# Patient Record
Sex: MALE | Race: OTHER | NOT HISPANIC OR LATINO | URBAN - METROPOLITAN AREA
[De-identification: names, ages, dates, MRNs, and addresses within clinical notes are randomized per-mention and may not be internally consistent; named-entity substitution may affect disease eponyms.]

---

## 2019-06-04 ENCOUNTER — INPATIENT (INPATIENT)
Facility: HOSPITAL | Age: 44
LOS: 0 days | Discharge: HOME | End: 2019-06-05
Attending: SURGERY | Admitting: SURGERY
Payer: SUBSIDIZED

## 2019-06-04 VITALS
SYSTOLIC BLOOD PRESSURE: 120 MMHG | TEMPERATURE: 97 F | OXYGEN SATURATION: 97 % | RESPIRATION RATE: 16 BRPM | DIASTOLIC BLOOD PRESSURE: 70 MMHG | HEART RATE: 70 BPM

## 2019-06-04 LAB
ALBUMIN SERPL ELPH-MCNC: 4.8 G/DL — SIGNIFICANT CHANGE UP (ref 3.5–5.2)
ALP SERPL-CCNC: 83 U/L — SIGNIFICANT CHANGE UP (ref 30–115)
ALT FLD-CCNC: 83 U/L — HIGH (ref 0–41)
ANION GAP SERPL CALC-SCNC: 12 MMOL/L — SIGNIFICANT CHANGE UP (ref 7–14)
APTT BLD: 29.5 SEC — SIGNIFICANT CHANGE UP (ref 27–39.2)
AST SERPL-CCNC: 48 U/L — HIGH (ref 0–41)
BASOPHILS # BLD AUTO: 0.03 K/UL — SIGNIFICANT CHANGE UP (ref 0–0.2)
BASOPHILS NFR BLD AUTO: 0.3 % — SIGNIFICANT CHANGE UP (ref 0–1)
BILIRUB SERPL-MCNC: 0.8 MG/DL — SIGNIFICANT CHANGE UP (ref 0.2–1.2)
BLD GP AB SCN SERPL QL: SIGNIFICANT CHANGE UP
BUN SERPL-MCNC: 19 MG/DL — SIGNIFICANT CHANGE UP (ref 10–20)
CALCIUM SERPL-MCNC: 9.8 MG/DL — SIGNIFICANT CHANGE UP (ref 8.5–10.1)
CHLORIDE SERPL-SCNC: 102 MMOL/L — SIGNIFICANT CHANGE UP (ref 98–110)
CO2 SERPL-SCNC: 26 MMOL/L — SIGNIFICANT CHANGE UP (ref 17–32)
CREAT SERPL-MCNC: 0.8 MG/DL — SIGNIFICANT CHANGE UP (ref 0.7–1.5)
EOSINOPHIL # BLD AUTO: 0.06 K/UL — SIGNIFICANT CHANGE UP (ref 0–0.7)
EOSINOPHIL NFR BLD AUTO: 0.5 % — SIGNIFICANT CHANGE UP (ref 0–8)
GLUCOSE SERPL-MCNC: 148 MG/DL — HIGH (ref 70–99)
HCT VFR BLD CALC: 42 % — SIGNIFICANT CHANGE UP (ref 42–52)
HGB BLD-MCNC: 13.8 G/DL — LOW (ref 14–18)
IMM GRANULOCYTES NFR BLD AUTO: 0.5 % — HIGH (ref 0.1–0.3)
LIDOCAIN IGE QN: 19 U/L — SIGNIFICANT CHANGE UP (ref 7–60)
LYMPHOCYTES # BLD AUTO: 1.6 K/UL — SIGNIFICANT CHANGE UP (ref 1.2–3.4)
LYMPHOCYTES # BLD AUTO: 13.5 % — LOW (ref 20.5–51.1)
MCHC RBC-ENTMCNC: 29.9 PG — SIGNIFICANT CHANGE UP (ref 27–31)
MCHC RBC-ENTMCNC: 32.9 G/DL — SIGNIFICANT CHANGE UP (ref 32–37)
MCV RBC AUTO: 90.9 FL — SIGNIFICANT CHANGE UP (ref 80–94)
MONOCYTES # BLD AUTO: 0.7 K/UL — HIGH (ref 0.1–0.6)
MONOCYTES NFR BLD AUTO: 5.9 % — SIGNIFICANT CHANGE UP (ref 1.7–9.3)
NEUTROPHILS # BLD AUTO: 9.42 K/UL — HIGH (ref 1.4–6.5)
NEUTROPHILS NFR BLD AUTO: 79.3 % — HIGH (ref 42.2–75.2)
NRBC # BLD: 0 /100 WBCS — SIGNIFICANT CHANGE UP (ref 0–0)
PLATELET # BLD AUTO: 196 K/UL — SIGNIFICANT CHANGE UP (ref 130–400)
POTASSIUM SERPL-MCNC: 5.4 MMOL/L — HIGH (ref 3.5–5)
POTASSIUM SERPL-SCNC: 5.4 MMOL/L — HIGH (ref 3.5–5)
PROT SERPL-MCNC: 7.7 G/DL — SIGNIFICANT CHANGE UP (ref 6–8)
RBC # BLD: 4.62 M/UL — LOW (ref 4.7–6.1)
RBC # FLD: 12.8 % — SIGNIFICANT CHANGE UP (ref 11.5–14.5)
SODIUM SERPL-SCNC: 140 MMOL/L — SIGNIFICANT CHANGE UP (ref 135–146)
WBC # BLD: 11.87 K/UL — HIGH (ref 4.8–10.8)
WBC # FLD AUTO: 11.87 K/UL — HIGH (ref 4.8–10.8)

## 2019-06-04 PROCEDURE — 99285 EMERGENCY DEPT VISIT HI MDM: CPT

## 2019-06-04 PROCEDURE — 71101 X-RAY EXAM UNILAT RIBS/CHEST: CPT | Mod: 26,RT

## 2019-06-04 PROCEDURE — 71046 X-RAY EXAM CHEST 2 VIEWS: CPT | Mod: 26

## 2019-06-04 PROCEDURE — 71260 CT THORAX DX C+: CPT | Mod: 26

## 2019-06-04 PROCEDURE — 93010 ELECTROCARDIOGRAM REPORT: CPT

## 2019-06-04 PROCEDURE — 74177 CT ABD & PELVIS W/CONTRAST: CPT | Mod: 26

## 2019-06-04 RX ORDER — SODIUM CHLORIDE 9 MG/ML
1000 INJECTION INTRAMUSCULAR; INTRAVENOUS; SUBCUTANEOUS ONCE
Refills: 0 | Status: COMPLETED | OUTPATIENT
Start: 2019-06-04 | End: 2019-06-04

## 2019-06-04 RX ORDER — HEPARIN SODIUM 5000 [USP'U]/ML
5000 INJECTION INTRAVENOUS; SUBCUTANEOUS EVERY 8 HOURS
Refills: 0 | Status: DISCONTINUED | OUTPATIENT
Start: 2019-06-04 | End: 2019-06-05

## 2019-06-04 RX ORDER — CHLORHEXIDINE GLUCONATE 213 G/1000ML
1 SOLUTION TOPICAL
Refills: 0 | Status: DISCONTINUED | OUTPATIENT
Start: 2019-06-04 | End: 2019-06-05

## 2019-06-04 RX ORDER — IBUPROFEN 200 MG
400 TABLET ORAL EVERY 6 HOURS
Refills: 0 | Status: DISCONTINUED | OUTPATIENT
Start: 2019-06-04 | End: 2019-06-05

## 2019-06-04 RX ORDER — PANTOPRAZOLE SODIUM 20 MG/1
40 TABLET, DELAYED RELEASE ORAL DAILY
Refills: 0 | Status: DISCONTINUED | OUTPATIENT
Start: 2019-06-04 | End: 2019-06-05

## 2019-06-04 RX ORDER — OXYCODONE AND ACETAMINOPHEN 5; 325 MG/1; MG/1
2 TABLET ORAL EVERY 6 HOURS
Refills: 0 | Status: DISCONTINUED | OUTPATIENT
Start: 2019-06-04 | End: 2019-06-04

## 2019-06-04 RX ORDER — ACETAMINOPHEN 500 MG
650 TABLET ORAL EVERY 6 HOURS
Refills: 0 | Status: DISCONTINUED | OUTPATIENT
Start: 2019-06-04 | End: 2019-06-05

## 2019-06-04 RX ORDER — MORPHINE SULFATE 50 MG/1
6 CAPSULE, EXTENDED RELEASE ORAL ONCE
Refills: 0 | Status: DISCONTINUED | OUTPATIENT
Start: 2019-06-04 | End: 2019-06-04

## 2019-06-04 RX ORDER — OXYCODONE HYDROCHLORIDE 5 MG/1
5 TABLET ORAL EVERY 6 HOURS
Refills: 0 | Status: DISCONTINUED | OUTPATIENT
Start: 2019-06-04 | End: 2019-06-05

## 2019-06-04 RX ORDER — MORPHINE SULFATE 50 MG/1
4 CAPSULE, EXTENDED RELEASE ORAL ONCE
Refills: 0 | Status: DISCONTINUED | OUTPATIENT
Start: 2019-06-04 | End: 2019-06-04

## 2019-06-04 RX ORDER — ONDANSETRON 8 MG/1
4 TABLET, FILM COATED ORAL EVERY 6 HOURS
Refills: 0 | Status: DISCONTINUED | OUTPATIENT
Start: 2019-06-04 | End: 2019-06-05

## 2019-06-04 RX ORDER — OXYCODONE AND ACETAMINOPHEN 5; 325 MG/1; MG/1
2 TABLET ORAL ONCE
Refills: 0 | Status: DISCONTINUED | OUTPATIENT
Start: 2019-06-04 | End: 2019-06-04

## 2019-06-04 RX ADMIN — SODIUM CHLORIDE 2000 MILLILITER(S): 9 INJECTION INTRAMUSCULAR; INTRAVENOUS; SUBCUTANEOUS at 12:57

## 2019-06-04 RX ADMIN — OXYCODONE AND ACETAMINOPHEN 2 TABLET(S): 5; 325 TABLET ORAL at 11:52

## 2019-06-04 RX ADMIN — HEPARIN SODIUM 5000 UNIT(S): 5000 INJECTION INTRAVENOUS; SUBCUTANEOUS at 21:32

## 2019-06-04 RX ADMIN — MORPHINE SULFATE 6 MILLIGRAM(S): 50 CAPSULE, EXTENDED RELEASE ORAL at 16:56

## 2019-06-04 NOTE — CONSULT NOTE ADULT - SUBJECTIVE AND OBJECTIVE BOX
TRAUMA ACTIVATION LEVEL:  TRAUMA CONSULT    MECHANISM OF INJURY:      [] Blunt  	[] MVC	[X] Fall	[] Pedestrian Struck	[] Motorcycle   [] Assault   [] Bicycle collision  [] Sports injury     [] Penetrating  	[] Gun Shot Wound 		[] Stab Wound    GCS: 15	 E: 4	V: 5	M: 6    HPI:  43M, no significant PMHx, presents to ED s/p fall. Pt states he was working outside, misstepped and fell onto a beam on his R side. Pt states he felt immediate pain, was able get up. Pt states he had trouble breathing initially but is not experiencing that now. Pt presents w/ R sided rib pain, no ecchymosis, no external signs of trauma. Pt denies HT, denies LOC, no abdominal pain, no musculoskeletal pain.     PAST MEDICAL & SURGICAL HISTORY:  denies     Allergies    No Known Allergies    Intolerances        Home Medications:  denies     ROS: 10-system review is otherwise negative except HPI above.      Primary Survey:    A - airway intact  B - bilateral breath sounds and good chest rise  C - palpable pulses in all extremities  D - GCS 15 on arrival, ANTHONY  Exposure obtained    Vital Signs Last 24 Hrs  T(C): 36.1 (04 Jun 2019 11:07), Max: 36.1 (04 Jun 2019 11:07)  T(F): 97 (04 Jun 2019 11:07), Max: 97 (04 Jun 2019 11:07)  HR: 70 (04 Jun 2019 11:07) (70 - 70)  BP: 120/70 (04 Jun 2019 11:07) (120/70 - 120/70)  BP(mean): --  RR: 16 (04 Jun 2019 11:07) (16 - 16)  SpO2: 97% (04 Jun 2019 11:07) (97% - 97%)    Secondary Survey:   General: NAD  HEENT: Normocephalic, atraumatic, EOMI, PEERLA. no scalp lacerations   Neck: Soft, midline trachea. no cspine tenderness  Chest: mild R sided chest wall tenderness. or subq emphysema, no ecchymosis or abrasions.   Cardiac: S1, S2, RRR  Respiratory: Bilateral breath sounds, clear and equal bilaterally  Abdomen: Soft, non-distended, non-tender, no rebound,   Groin: Normal appearing, pelvis stable   Ext: palp radial b/l UE, b/l DP palp in Lower Extrem.   Back: no TTP, no palpable runoff/stepoff/deformity    FAST    Procedures:    LABS:  Labs:  CAPILLARY BLOOD GLUCOSE                              13.8   11.87 )-----------( 196      ( 04 Jun 2019 12:40 )             42.0       Auto Neutrophil %: 79.3 % (06-04-19 @ 12:40)  Auto Immature Granulocyte %: 0.5 % (06-04-19 @ 12:40)    06-04    140  |  102  |  19  ----------------------------<  148<H>  5.4<H>   |  26  |  0.8      Calcium, Total Serum: 9.8 mg/dL (06-04-19 @ 12:40)      LFTs:             7.7  | 0.8  | 48       ------------------[83      ( 04 Jun 2019 12:40 )  4.8  | x    | 83          Lipase:19     Amylase:x             Coags:     x      ----< x       ( 04 Jun 2019 12:40 )     29.5                RADIOLOGY & ADDITIONAL STUDIES:    PENDING     --------------------------------------------------------------------------------------- TRAUMA ACTIVATION LEVEL:  TRAUMA CONSULT    MECHANISM OF INJURY:      [] Blunt  	[] MVC	[X] Fall	[] Pedestrian Struck	[] Motorcycle   [] Assault   [] Bicycle collision  [] Sports injury     [] Penetrating  	[] Gun Shot Wound 		[] Stab Wound    GCS: 15	 E: 4	V: 5	M: 6    HPI:  43M, no significant PMHx, presents to ED s/p fall. Pt states he was working outside, misstepped and fell onto a beam on his R side. Pt states he felt immediate pain, was able get up. Pt states he had trouble breathing initially but is not experiencing that now. Pt presents w/ R sided rib pain, no ecchymosis, no external signs of trauma. Pt denies HT, denies LOC, no abdominal pain, no musculoskeletal pain.     PAST MEDICAL & SURGICAL HISTORY:  denies     Allergies    No Known Allergies    Intolerances        Home Medications:  denies     ROS: 10-system review is otherwise negative except HPI above.      Primary Survey:    A - airway intact  B - bilateral breath sounds and good chest rise  C - palpable pulses in all extremities  D - GCS 15 on arrival, ANTHONY  Exposure obtained    Vital Signs Last 24 Hrs  T(C): 36.1 (04 Jun 2019 11:07), Max: 36.1 (04 Jun 2019 11:07)  T(F): 97 (04 Jun 2019 11:07), Max: 97 (04 Jun 2019 11:07)  HR: 70 (04 Jun 2019 11:07) (70 - 70)  BP: 120/70 (04 Jun 2019 11:07) (120/70 - 120/70)  BP(mean): --  RR: 16 (04 Jun 2019 11:07) (16 - 16)  SpO2: 97% (04 Jun 2019 11:07) (97% - 97%)    Secondary Survey:   General: NAD  HEENT: Normocephalic, atraumatic, EOMI, PEERLA. no scalp lacerations   Neck: Soft, midline trachea. no cspine tenderness  Chest: mild R sided chest wall tenderness. or subq emphysema, no ecchymosis or abrasions.   Cardiac: S1, S2, RRR  Respiratory: Bilateral breath sounds, clear and equal bilaterally  Abdomen: Soft, non-distended, non-tender, no rebound,   Groin: Normal appearing, pelvis stable   Ext: palp radial b/l UE, b/l DP palp in Lower Extrem.   Back: no TTP, no palpable runoff/stepoff/deformity    FAST    Procedures:    LABS:  Labs:  CAPILLARY BLOOD GLUCOSE                              13.8   11.87 )-----------( 196      ( 04 Jun 2019 12:40 )             42.0       Auto Neutrophil %: 79.3 % (06-04-19 @ 12:40)  Auto Immature Granulocyte %: 0.5 % (06-04-19 @ 12:40)    06-04    140  |  102  |  19  ----------------------------<  148<H>  5.4<H>   |  26  |  0.8      Calcium, Total Serum: 9.8 mg/dL (06-04-19 @ 12:40)      LFTs:             7.7  | 0.8  | 48       ------------------[83      ( 04 Jun 2019 12:40 )  4.8  | x    | 83          Lipase:19     Amylase:x             Coags:     x      ----< x       ( 04 Jun 2019 12:40 )     29.5                RADIOLOGY & ADDITIONAL STUDIES:  < from: Xray Chest 2 Views PA/Lat (06.04.19 @ 12:27) >  Impression:    No radiographic evidence of acute cardiopulmonary disease.  Minimally displaced right eighth rib fracture  < end of copied text >      < from: CT Abdomen and Pelvis w/ IV Cont (06.04.19 @ 16:01) >  IMPRESSION:  Minimally displaced right posterior 8th, 9th, and 10th ribs with adjacent   small right pleural effusion. Each of these are fractured in 2 places.   Right lower lung field opacity, likely atelectasis. In the setting of   trauma, contusion is not excluded.  No pneumothorax.  No evidence for acute intra-abdominal traumatic pathology.  < end of copied text >      < from: CT Chest w/ IV Cont (06.04.19 @ 16:00) >  IMPRESSION:  Minimally displaced right posterior 8th, 9th, and 10th ribs with adjacent   small right pleural effusion. Each of these are fractured in 2 places.   Right lower lung field opacity, likely atelectasis. In the setting of   trauma, contusion is not excluded.  No pneumothorax.  No evidence for acute intra-abdominal traumatic pathology.  < end of copied text >      ---------------------------------------------------------------------------------------

## 2019-06-04 NOTE — ED PROVIDER NOTE - OBJECTIVE STATEMENT
Patient is a 43 year old male without PMHX presenting to ED S/P trip and fall at work today. States that he landed on his right side on ribs. c/o right rib pain. Denies had trauma, back pain, neck pain, n/v, abdominal pain, fevers, shortness of breath, other injuries

## 2019-06-04 NOTE — ED PROVIDER NOTE - NS ED ROS FT
Constitutional: See HPI. No fever/chills.  Neck: No neck pain or stiffness.  Cardiac: No chest pain, SOB or edema. No chest pain with exertion.  Respiratory: No cough or respiratory distress. No hemoptysis.   GI: No nausea, vomiting, diarrhea or abdominal pain.  : No dysuria, frequency or burning.   MS: + right sided rib pain  Neuro: No headache or weakness. No LOC.  Skin: No rash.  Endocrine: No history of thyroid disease or diabetes.  Except as documented in the HPI, all other systems are negative.

## 2019-06-04 NOTE — H&P ADULT - HISTORY OF PRESENT ILLNESS
HPI:  43M, no significant PMHx, presents to ED s/p fall. Pt states he was working outside, misstepped and fell onto a beam on his R side. Pt states he felt immediate pain, was able get up. Pt states he had trouble breathing initially but is not experiencing that now. Pt presents w/ R sided rib pain, no ecchymosis, no external signs of trauma. Pt denies HT, denies LOC, no abdominal pain, no musculoskeletal pain.     PAST MEDICAL & SURGICAL HISTORY:  denies     Allergies    No Known Allergies    Intolerances        Home Medications:  denies     ROS: 10-system review is otherwise negative except HPI above.      Primary Survey:    A - airway intact  B - bilateral breath sounds and good chest rise  C - palpable pulses in all extremities  D - GCS 15 on arrival, ANTHONY  Exposure obtained    Vital Signs Last 24 Hrs  T(C): 36.1 (04 Jun 2019 11:07), Max: 36.1 (04 Jun 2019 11:07)  T(F): 97 (04 Jun 2019 11:07), Max: 97 (04 Jun 2019 11:07)  HR: 70 (04 Jun 2019 11:07) (70 - 70)  BP: 120/70 (04 Jun 2019 11:07) (120/70 - 120/70)  BP(mean): --  RR: 16 (04 Jun 2019 11:07) (16 - 16)  SpO2: 97% (04 Jun 2019 11:07) (97% - 97%)    Secondary Survey:   General: NAD  HEENT: Normocephalic, atraumatic, EOMI, PEERLA. no scalp lacerations   Neck: Soft, midline trachea. no cspine tenderness  Chest: mild R sided chest wall tenderness. or subq emphysema, no ecchymosis or abrasions.   Cardiac: S1, S2, RRR  Respiratory: Bilateral breath sounds, clear and equal bilaterally  Abdomen: Soft, non-distended, non-tender, no rebound,   Groin: Normal appearing, pelvis stable   Ext: palp radial b/l UE, b/l DP palp in Lower Extrem.   Back: no TTP, no palpable runoff/stepoff/deformity    FAST    Procedures:    LABS:  Labs:  CAPILLARY BLOOD GLUCOSE                              13.8   11.87 )-----------( 196      ( 04 Jun 2019 12:40 )             42.0       Auto Neutrophil %: 79.3 % (06-04-19 @ 12:40)  Auto Immature Granulocyte %: 0.5 % (06-04-19 @ 12:40)    06-04    140  |  102  |  19  ----------------------------<  148<H>  5.4<H>   |  26  |  0.8      Calcium, Total Serum: 9.8 mg/dL (06-04-19 @ 12:40)      LFTs:             7.7  | 0.8  | 48       ------------------[83      ( 04 Jun 2019 12:40 )  4.8  | x    | 83          Lipase:19     Amylase:x             Coags:     x      ----< x       ( 04 Jun 2019 12:40 )     29.5                RADIOLOGY & ADDITIONAL STUDIES:  < from: Xray Chest 2 Views PA/Lat (06.04.19 @ 12:27) >  Impression:    No radiographic evidence of acute cardiopulmonary disease.  Minimally displaced right eighth rib fracture  < end of copied text >      < from: CT Abdomen and Pelvis w/ IV Cont (06.04.19 @ 16:01) >  IMPRESSION:  Minimally displaced right posterior 8th, 9th, and 10th ribs with adjacent   small right pleural effusion. Each of these are fractured in 2 places.   Right lower lung field opacity, likely atelectasis. In the setting of   trauma, contusion is not excluded.  No pneumothorax.  No evidence for acute intra-abdominal traumatic pathology.  < end of copied text >      < from: CT Chest w/ IV Cont (06.04.19 @ 16:00) >  IMPRESSION:  Minimally displaced right posterior 8th, 9th, and 10th ribs with adjacent   small right pleural effusion. Each of these are fractured in 2 places.   Right lower lung field opacity, likely atelectasis. In the setting of   trauma, contusion is not excluded.  No pneumothorax.  No evidence for acute intra-abdominal traumatic pathology.  < end of copied text >      ---------------------------------------------------------------------------------------        Assessment and Recommendation:   · Assessment		  ASSESSMENT:  43M, s/p fall onto R side, -HT, -LOC, -AC. Right sided chest wall pain. Pulling 1000 on IS.  Minimally displaced R post. 8,9,10 rib fx, w/ adjacent small R pleural effusion.   R lower lung opacity, likely atelectasis   PLAN:   - Tylenol and Motrin around the clock  - percocet for breakthrough  - Incentive spirometry  - PO diet  - 11:30 labs  - PPI

## 2019-06-04 NOTE — ED PROVIDER NOTE - CARE PLAN
Principal Discharge DX:	Closed fracture of multiple ribs of right side, initial encounter  Secondary Diagnosis:	Fall, initial encounter

## 2019-06-04 NOTE — ED ADULT TRIAGE NOTE - CHIEF COMPLAINT QUOTE
right sided rib pain x 45 mins, fell down while working, landed on wood on right side, denies head injury, no LOC and anticoagulant, no flail chest noted

## 2019-06-04 NOTE — ED PROVIDER NOTE - ATTENDING CONTRIBUTION TO CARE
Seen with PA agree with above, lungs- clear, abdomen- soft no tenderness to any region, neuro- non focal

## 2019-06-04 NOTE — ED ADULT NURSE NOTE - NSIMPLEMENTINTERV_GEN_ALL_ED
Implemented All Fall Risk Interventions:  [place] to call system. Call bell, personal items and telephone within reach. Instruct patient to call for assistance. Room bathroom lighting operational. Non-slip footwear when patient is off stretcher. Physically safe environment: no spills, clutter or unnecessary equipment. Stretcher in lowest position, wheels locked, appropriate side rails in place. Provide visual cue, wrist band, yellow gown, etc. Monitor gait and stability. Monitor for mental status changes and reorient to person, place, and time. Review medications for side effects contributing to fall risk. Reinforce activity limits and safety measures with patient and family.

## 2019-06-04 NOTE — ED PROVIDER NOTE - PROGRESS NOTE DETAILS
When patient seen initially, he was in too much pain to lay down for abdominal exam,. After pain meds, was better able to evaluate abdominal exam and patient had + RUQ tenderness. Trauma consult called. will get labs and ct. seen by trauma. awaiting CT scans Spoke with trauma. Dr Eddy. Will admit to Dr Alvarado.

## 2019-06-04 NOTE — CONSULT NOTE ADULT - ASSESSMENT
ASSESSMENT:  43M, s/p fall onto R side, -HT, -LOC, -AC. Right sided chest wall pain.   PLAN:   - f/u CXR   - f/u CT Chest, CTAP   - f/u labs ASSESSMENT:  43M, s/p fall onto R side, -HT, -LOC, -AC. Right sided chest wall pain. Pulling 1000 on IS.  PLAN:   - f/u CXR   - f/u CT Chest, CTAP   - f/u labs ASSESSMENT:  43M, s/p fall onto R side, -HT, -LOC, -AC. Right sided chest wall pain. Pulling 1000 on IS.  Minimally displaced R post. 8,9,10 rib fx, w/ adjacent small R pleural effusion.   R lower lung opacity, likely atelectasis   PLAN:   - f/u CXR   - f/u CT Chest, CTAP   - f/u labs

## 2019-06-04 NOTE — ED PROVIDER NOTE - PHYSICAL EXAMINATION
VITAL SIGNS: I have reviewed nursing notes and confirm.  CONSTITUTIONAL: Well-developed; well-nourished; in no acute distress.  SKIN: Skin exam is warm and dry, no acute rash.  HEAD: Normocephalic; atraumatic.  NECK: Supple; non tender.  CARD: S1, S2 normal; no murmurs, gallops, or rubs. Regular rate and rhythm.  CHEST: no bruising or signs of trauma noted to chest wall. + diffuse ttp right anterior lateral lower ribs  RESP: No wheezes, rales or rhonchi.  ABD: Normal bowel sounds; soft; non-distended; + RUQ ttp  EXT: Normal ROM. No clubbing, cyanosis or edema.  LYMPH: No acute cervical adenopathy.  NEURO: Alert, oriented. Grossly unremarkable. No focal deficits.  PSYCH: Cooperative, appropriate.

## 2019-06-04 NOTE — ED PROVIDER NOTE - CLINICAL SUMMARY MEDICAL DECISION MAKING FREE TEXT BOX
Pt endorsed to me by Dr Avila awaiting CT s/p mechanical trip and fall c/o RUQ and chest pain. CT revealed multiple rib fractures without underlying lung injury; trauma was consulted, pt admitted for pain control and observation.

## 2019-06-05 ENCOUNTER — TRANSCRIPTION ENCOUNTER (OUTPATIENT)
Age: 44
End: 2019-06-05

## 2019-06-05 VITALS
DIASTOLIC BLOOD PRESSURE: 59 MMHG | TEMPERATURE: 96 F | SYSTOLIC BLOOD PRESSURE: 113 MMHG | HEART RATE: 51 BPM | RESPIRATION RATE: 16 BRPM

## 2019-06-05 LAB
ANION GAP SERPL CALC-SCNC: 13 MMOL/L — SIGNIFICANT CHANGE UP (ref 7–14)
BASOPHILS # BLD AUTO: 0.02 K/UL — SIGNIFICANT CHANGE UP (ref 0–0.2)
BASOPHILS NFR BLD AUTO: 0.2 % — SIGNIFICANT CHANGE UP (ref 0–1)
BUN SERPL-MCNC: 17 MG/DL — SIGNIFICANT CHANGE UP (ref 10–20)
CALCIUM SERPL-MCNC: 9 MG/DL — SIGNIFICANT CHANGE UP (ref 8.5–10.1)
CHLORIDE SERPL-SCNC: 101 MMOL/L — SIGNIFICANT CHANGE UP (ref 98–110)
CO2 SERPL-SCNC: 26 MMOL/L — SIGNIFICANT CHANGE UP (ref 17–32)
CREAT SERPL-MCNC: 0.9 MG/DL — SIGNIFICANT CHANGE UP (ref 0.7–1.5)
EOSINOPHIL # BLD AUTO: 0.01 K/UL — SIGNIFICANT CHANGE UP (ref 0–0.7)
EOSINOPHIL NFR BLD AUTO: 0.1 % — SIGNIFICANT CHANGE UP (ref 0–8)
GLUCOSE SERPL-MCNC: 130 MG/DL — HIGH (ref 70–99)
HCT VFR BLD CALC: 36.7 % — LOW (ref 42–52)
HGB BLD-MCNC: 11.8 G/DL — LOW (ref 14–18)
IMM GRANULOCYTES NFR BLD AUTO: 0.2 % — SIGNIFICANT CHANGE UP (ref 0.1–0.3)
LYMPHOCYTES # BLD AUTO: 1.31 K/UL — SIGNIFICANT CHANGE UP (ref 1.2–3.4)
LYMPHOCYTES # BLD AUTO: 15 % — LOW (ref 20.5–51.1)
MAGNESIUM SERPL-MCNC: 2 MG/DL — SIGNIFICANT CHANGE UP (ref 1.8–2.4)
MCHC RBC-ENTMCNC: 29.2 PG — SIGNIFICANT CHANGE UP (ref 27–31)
MCHC RBC-ENTMCNC: 32.2 G/DL — SIGNIFICANT CHANGE UP (ref 32–37)
MCV RBC AUTO: 90.8 FL — SIGNIFICANT CHANGE UP (ref 80–94)
MONOCYTES # BLD AUTO: 0.77 K/UL — HIGH (ref 0.1–0.6)
MONOCYTES NFR BLD AUTO: 8.8 % — SIGNIFICANT CHANGE UP (ref 1.7–9.3)
NEUTROPHILS # BLD AUTO: 6.58 K/UL — HIGH (ref 1.4–6.5)
NEUTROPHILS NFR BLD AUTO: 75.7 % — HIGH (ref 42.2–75.2)
NRBC # BLD: 0 /100 WBCS — SIGNIFICANT CHANGE UP (ref 0–0)
PHOSPHATE SERPL-MCNC: 3.6 MG/DL — SIGNIFICANT CHANGE UP (ref 2.1–4.9)
PLATELET # BLD AUTO: 177 K/UL — SIGNIFICANT CHANGE UP (ref 130–400)
POTASSIUM SERPL-MCNC: 4.2 MMOL/L — SIGNIFICANT CHANGE UP (ref 3.5–5)
POTASSIUM SERPL-SCNC: 4.2 MMOL/L — SIGNIFICANT CHANGE UP (ref 3.5–5)
RBC # BLD: 4.04 M/UL — LOW (ref 4.7–6.1)
RBC # FLD: 13 % — SIGNIFICANT CHANGE UP (ref 11.5–14.5)
SODIUM SERPL-SCNC: 140 MMOL/L — SIGNIFICANT CHANGE UP (ref 135–146)
WBC # BLD: 8.71 K/UL — SIGNIFICANT CHANGE UP (ref 4.8–10.8)
WBC # FLD AUTO: 8.71 K/UL — SIGNIFICANT CHANGE UP (ref 4.8–10.8)

## 2019-06-05 PROCEDURE — 71045 X-RAY EXAM CHEST 1 VIEW: CPT | Mod: 26

## 2019-06-05 PROCEDURE — 99233 SBSQ HOSP IP/OBS HIGH 50: CPT

## 2019-06-05 PROCEDURE — 93010 ELECTROCARDIOGRAM REPORT: CPT

## 2019-06-05 RX ORDER — IBUPROFEN 200 MG
1 TABLET ORAL
Qty: 0 | Refills: 0 | DISCHARGE
Start: 2019-06-05

## 2019-06-05 RX ORDER — ACETAMINOPHEN 500 MG
2 TABLET ORAL
Qty: 0 | Refills: 0 | DISCHARGE
Start: 2019-06-05

## 2019-06-05 RX ADMIN — Medication 400 MILLIGRAM(S): at 00:19

## 2019-06-05 RX ADMIN — Medication 650 MILLIGRAM(S): at 12:53

## 2019-06-05 RX ADMIN — Medication 650 MILLIGRAM(S): at 13:34

## 2019-06-05 RX ADMIN — Medication 650 MILLIGRAM(S): at 05:53

## 2019-06-05 RX ADMIN — PANTOPRAZOLE SODIUM 40 MILLIGRAM(S): 20 TABLET, DELAYED RELEASE ORAL at 12:53

## 2019-06-05 RX ADMIN — Medication 650 MILLIGRAM(S): at 00:19

## 2019-06-05 RX ADMIN — HEPARIN SODIUM 5000 UNIT(S): 5000 INJECTION INTRAVENOUS; SUBCUTANEOUS at 13:35

## 2019-06-05 RX ADMIN — Medication 400 MILLIGRAM(S): at 05:54

## 2019-06-05 RX ADMIN — Medication 400 MILLIGRAM(S): at 12:52

## 2019-06-05 RX ADMIN — HEPARIN SODIUM 5000 UNIT(S): 5000 INJECTION INTRAVENOUS; SUBCUTANEOUS at 05:54

## 2019-06-05 RX ADMIN — Medication 400 MILLIGRAM(S): at 13:34

## 2019-06-05 RX ADMIN — Medication 650 MILLIGRAM(S): at 05:54

## 2019-06-05 NOTE — DISCHARGE NOTE PROVIDER - HOSPITAL COURSE
43M, no significant PMH, presented to the ED s/p fall -HT, -LOC, - anticoagulation . Patient stated he was working outside, misstepped and fell onto a beam on his R side. He stated he felt immediate pain, was able get up but had trouble breathing initially but was not experiencing that on admission. He was seen by the trauma team and CT scans were done that showed minimally displaced right sided 8-10 rib fractures and a small right pleural effusions. His pain was controlled and he is cleared from trauma for discharge with follow in the trauma clinic.

## 2019-06-05 NOTE — DISCHARGE NOTE PROVIDER - NSDCCPCAREPLAN_GEN_ALL_CORE_FT
PRINCIPAL DISCHARGE DIAGNOSIS  Diagnosis: Closed fracture of multiple ribs of right side, initial encounter  Assessment and Plan of Treatment:       SECONDARY DISCHARGE DIAGNOSES  Diagnosis: Fall, initial encounter  Assessment and Plan of Treatment:

## 2019-06-05 NOTE — DISCHARGE NOTE PROVIDER - CARE PROVIDER_API CALL
Mart Judd)  Surgery; Surgical Critical Care  [address] [place] [address], [address] [place] [address]  Phone: [phone]  Fax: [phone]  Follow Up Time:

## 2019-06-05 NOTE — DISCHARGE NOTE NURSING/CASE MANAGEMENT/SOCIAL WORK - NSDCDPATPORTLINK_GEN_ALL_CORE
You can access the [facility], offered by [facility], by registering with the following website: [email or web address]

## 2019-06-05 NOTE — PROGRESS NOTE ADULT - SUBJECTIVE AND OBJECTIVE BOX
GENERAL SURGERY PROGRESS NOTE     MERCEDES MARTINEZ  43y  Male  Hospital day :1d    OVERNIGHT EVENTS:no acute events overnight, patient has moderate pain, and pulling 500-700 ml incentive spirometry, ambulating, tolerating diet    T(F): 97.2 (06-05-19 @ 04:00), Max: 97.9 (06-04-19 @ 19:30)  HR: 45 (06-05-19 @ 04:00) (45 - 70)  BP: 110/57 (06-05-19 @ 04:00) (110/57 - 141/63)  RR: 16 (06-05-19 @ 04:00) (16 - 18)  SpO2: 97% (06-04-19 @ 11:07) (97% - 97%)      GI proph:  pantoprazole  Injectable 40 milliGRAM(s) IV Push daily    AC/ proph: heparin  Injectable 5000 Unit(s) SubCutaneous every 8 hours    ABx:     PHYSICAL EXAM:  GENERAL: NAD,   CHEST/LUNG: Clear to auscultation bilaterally  HEART: Regular rate and rhythm  Abdomen- soft non tender, non distended         LABS  Labs:  CAPILLARY BLOOD GLUCOSE                              11.8   8.71  )-----------( 177      ( 05 Jun 2019 00:35 )             36.7       Auto Neutrophil %: 75.7 % (06-05-19 @ 00:35)  Auto Immature Granulocyte %: 0.2 % (06-05-19 @ 00:35)  Auto Neutrophil %: 79.3 % (06-04-19 @ 12:40)  Auto Immature Granulocyte %: 0.5 % (06-04-19 @ 12:40)    06-05    140  |  101  |  17  ----------------------------<  130<H>  4.2   |  26  |  0.9      Calcium, Total Serum: 9.0 mg/dL (06-05-19 @ 00:35)      LFTs:             7.7  | 0.8  | 48       ------------------[83      ( 04 Jun 2019 12:40 )  4.8  | x    | 83          Lipase:19     Amylase:x             Coags:     x      ----< x       ( 04 Jun 2019 12:40 )     29.5

## 2019-06-07 PROBLEM — Z78.9 OTHER SPECIFIED HEALTH STATUS: Chronic | Status: ACTIVE | Noted: 2019-06-04

## 2019-06-20 DIAGNOSIS — S22.41XA MULTIPLE FRACTURES OF RIBS, RIGHT SIDE, INITIAL ENCOUNTER FOR CLOSED FRACTURE: ICD-10-CM
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
## 2019-06-20 DIAGNOSIS — J98.11 ATELECTASIS: ICD-10-CM
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
## 2019-06-20 DIAGNOSIS — J90 PLEURAL EFFUSION, NOT ELSEWHERE CLASSIFIED: ICD-10-CM
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
## 2019-06-25 ENCOUNTER — APPOINTMENT (OUTPATIENT)
Dept: SURGERY | Facility: CLINIC | Age: 44
End: 2019-06-25
Payer: COMMERCIAL

## 2019-06-25 VITALS
HEIGHT: 65 IN | WEIGHT: 164 LBS | BODY MASS INDEX: 27.32 KG/M2 | DIASTOLIC BLOOD PRESSURE: 66 MMHG | SYSTOLIC BLOOD PRESSURE: 124 MMHG

## 2019-06-25 DIAGNOSIS — Z78.9 OTHER SPECIFIED HEALTH STATUS: ICD-10-CM

## 2019-06-25 DIAGNOSIS — Z00.00 ENCOUNTER FOR GENERAL ADULT MEDICAL EXAMINATION W/OUT ABNORMAL FINDINGS: ICD-10-CM

## 2019-06-25 PROCEDURE — 99212 OFFICE O/P EST SF 10 MIN: CPT

## 2019-06-25 NOTE — PHYSICAL EXAM
[Normal Breath Sounds] : Normal breath sounds [JVD] : no jugular venous distention  [Abdomen Tenderness] : ~T ~M No abdominal tenderness [Abdominal Masses] : No abdominal masses [No HSM] : no hepatosplenomegaly [Tender] : was nontender [No Rash or Lesion] : No rash or lesion [Alert] : alert [Oriented to Place] : oriented to place [Oriented to Person] : oriented to person [de-identified] : doing well with good inspiratory effort  [Calm] : calm [Oriented to Time] : oriented to time [de-identified] : MILADIS

## 2019-06-25 NOTE — HISTORY OF PRESENT ILLNESS
[FreeTextEntry1] : s/p fall with multiple ribs fracture 3 weeks ago, was seen in the hospital ,here for f/u

## 2019-12-11 ENCOUNTER — CHART COPY (OUTPATIENT)
Age: 44
End: 2019-12-11

## 2023-03-06 NOTE — DISCHARGE NOTE PROVIDER - INSTRUCTIONS
No restrictions Rinvoq Counseling: I discussed with the patient the risks of Rinvoq therapy including but not limited to upper respiratory tract infections, shingles, cold sores, bronchitis, nausea, cough, fever, acne, and headache. Live vaccines should be avoided.  This medication has been linked to serious infections; higher rate of mortality; malignancy and lymphoproliferative disorders; major adverse cardiovascular events; thrombosis; thrombocytopenia, anemia, and neutropenia; lipid elevations; liver enzyme elevations; and gastrointestinal perforations.
